# Patient Record
Sex: MALE | Race: ASIAN | Employment: OTHER | URBAN - METROPOLITAN AREA
[De-identification: names, ages, dates, MRNs, and addresses within clinical notes are randomized per-mention and may not be internally consistent; named-entity substitution may affect disease eponyms.]

---

## 2018-12-29 ENCOUNTER — APPOINTMENT (OUTPATIENT)
Dept: GENERAL RADIOLOGY | Age: 43
End: 2018-12-29
Attending: EMERGENCY MEDICINE
Payer: COMMERCIAL

## 2018-12-29 ENCOUNTER — HOSPITAL ENCOUNTER (EMERGENCY)
Age: 43
Discharge: HOME | End: 2018-12-30
Attending: EMERGENCY MEDICINE
Payer: COMMERCIAL

## 2018-12-29 DIAGNOSIS — J11.1 INFLUENZA: Primary | ICD-10-CM

## 2018-12-29 PROCEDURE — 99283 EMERGENCY DEPT VISIT LOW MDM: CPT

## 2018-12-29 PROCEDURE — 87502 INFLUENZA DNA AMP PROBE: CPT | Performed by: EMERGENCY MEDICINE

## 2018-12-29 PROCEDURE — 71046 X-RAY EXAM CHEST 2 VIEWS: CPT | Performed by: EMERGENCY MEDICINE

## 2018-12-29 RX ORDER — IBUPROFEN 600 MG/1
600 TABLET ORAL ONCE
Status: COMPLETED | OUTPATIENT
Start: 2018-12-29 | End: 2018-12-29

## 2018-12-30 VITALS
BODY MASS INDEX: 26.66 KG/M2 | DIASTOLIC BLOOD PRESSURE: 69 MMHG | TEMPERATURE: 98 F | SYSTOLIC BLOOD PRESSURE: 122 MMHG | OXYGEN SATURATION: 96 % | HEART RATE: 98 BPM | WEIGHT: 160 LBS | HEIGHT: 65 IN | RESPIRATION RATE: 18 BRPM

## 2018-12-30 LAB
POCT INFLUENZA A: POSITIVE
POCT INFLUENZA B: NEGATIVE

## 2018-12-30 NOTE — ED PROVIDER NOTES
Patient Seen in: [facility] In [place]    History   Patient presents with:  Fever (infectious)    Stated Complaint: fever, cough    HPI    This is a 41-year-old male that is presents with cough and fever.   States he had a cough for co clear.  Oropharynx is clear and moist.   Lungs: Clear to auscultation bilaterally with no rales, no retractions, and no wheezing. Harsh, brassy cough. HEART:  Regular rate and rhythm. S1 and S2. No murmurs, no rubs or gallops.    ABDOMEN: Soft, nontender encounter diagnosis)    Disposition:  Discharge  12/30/2018 12:16 am    Follow-up:  Moira Caicedo, [address]  [phone]    In 1 week  sooner, If symptoms worsen        Medications Prescribed:  Discharge Medication List

## (undated) NOTE — ED AVS SNAPSHOT
Radha Morales   MRN: [id number]    Department:  [facility] in [place]   Date of Visit:  12/29/2018           Disclosure     Insurance plans vary and the physician(s) referred by the ER may not be covered by your plan.  Please con tell this physician (or your personal doctor if your instructions are to return to your personal doctor) about any new or lasting problems. The primary care or specialist physician will see patients referred from the [facility] Emergency Department.  Josee Gayle

## (undated) NOTE — LETTER
Date & Time: 12/30/2018, 12:16 AM  Patient: Jeremy Silva  Encounter Provider(s):    Antonio Faust DO       To Whom It May Concern:    Erman Burkitt was seen and treated in our department on 12/29/2018. He should not return to work until 1/4/19.